# Patient Record
Sex: MALE | Race: WHITE | Employment: FULL TIME | URBAN - METROPOLITAN AREA
[De-identification: names, ages, dates, MRNs, and addresses within clinical notes are randomized per-mention and may not be internally consistent; named-entity substitution may affect disease eponyms.]

---

## 2017-02-11 ENCOUNTER — HOSPITAL ENCOUNTER (EMERGENCY)
Age: 32
Discharge: HOME | End: 2017-02-11
Attending: EMERGENCY MEDICINE
Payer: MEDICAID

## 2017-02-11 ENCOUNTER — APPOINTMENT (OUTPATIENT)
Dept: GENERAL RADIOLOGY | Age: 32
End: 2017-02-11
Attending: EMERGENCY MEDICINE
Payer: MEDICAID

## 2017-02-11 ENCOUNTER — APPOINTMENT (OUTPATIENT)
Dept: CT IMAGING | Age: 32
End: 2017-02-11
Attending: EMERGENCY MEDICINE
Payer: MEDICAID

## 2017-02-11 VITALS
HEIGHT: 74 IN | HEART RATE: 93 BPM | SYSTOLIC BLOOD PRESSURE: 144 MMHG | RESPIRATION RATE: 18 BRPM | WEIGHT: 315 LBS | DIASTOLIC BLOOD PRESSURE: 89 MMHG | BODY MASS INDEX: 40.43 KG/M2 | TEMPERATURE: 97 F | OXYGEN SATURATION: 96 %
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
## 2017-02-11 DIAGNOSIS — S22.42XA CLOSED FRACTURE OF MULTIPLE RIBS OF LEFT SIDE, INITIAL ENCOUNTER: ICD-10-CM

## 2017-02-11 LAB
ALBUMIN SERPL-MCNC: 4.3 G/DL (ref 3.5–4.8)
ALP LIVER SERPL-CCNC: 98 U/L (ref 45–117)
ALT SERPL-CCNC: 36 U/L (ref 17–63)
AST SERPL-CCNC: 26 U/L (ref 15–41)
BASOPHILS # BLD AUTO: 0.05 X10(3) UL (ref 0–0.1)
BASOPHILS NFR BLD AUTO: 0.4 %
BILIRUB SERPL-MCNC: 0.6 MG/DL (ref 0.1–2)
BUN BLD-MCNC: 15 MG/DL (ref 8–20)
CALCIUM BLD-MCNC: 9 MG/DL (ref 8.3–10.3)
CHLORIDE: 106 MMOL/L (ref 101–111)
CO2: 25 MMOL/L (ref 22–32)
CREAT BLD-MCNC: 0.8 MG/DL (ref 0.7–1.3)
EOSINOPHIL # BLD AUTO: 0.05 X10(3) UL (ref 0–0.3)
EOSINOPHIL NFR BLD AUTO: 0.4 %
ERYTHROCYTE [DISTWIDTH] IN BLOOD BY AUTOMATED COUNT: 13.1 % (ref 11.5–16)
GLUCOSE BLD-MCNC: 116 MG/DL (ref 70–99)
HCT VFR BLD AUTO: 46.6 % (ref 37–53)
HGB BLD-MCNC: 16 G/DL (ref 13–17)
IMMATURE GRANULOCYTE COUNT: 0.06 X10(3) UL (ref 0–1)
IMMATURE GRANULOCYTE RATIO %: 0.4 %
LYMPHOCYTES # BLD AUTO: 2.32 X10(3) UL (ref 0.9–4)
LYMPHOCYTES NFR BLD AUTO: 16.4 %
M PROTEIN MFR SERPL ELPH: 8.1 G/DL (ref 6.1–8.3)
MCH RBC QN AUTO: 33.5 PG (ref 27–33.2)
MCHC RBC AUTO-ENTMCNC: 34.3 G/DL (ref 31–37)
MCV RBC AUTO: 97.5 FL (ref 80–99)
MONOCYTES # BLD AUTO: 1.08 X10(3) UL (ref 0.1–0.6)
MONOCYTES NFR BLD AUTO: 7.6 %
NEUTROPHIL ABS PRELIM: 10.57 X10 (3) UL (ref 1.3–6.7)
NEUTROPHILS # BLD AUTO: 10.57 X10(3) UL (ref 1.3–6.7)
NEUTROPHILS NFR BLD AUTO: 74.8 %
PLATELET # BLD AUTO: 291 10(3)UL (ref 150–450)
POTASSIUM SERPL-SCNC: 4.8 MMOL/L (ref 3.6–5.1)
RBC # BLD AUTO: 4.78 X10(6)UL (ref 4.3–5.7)
RED CELL DISTRIBUTION WIDTH-SD: 47.3 FL (ref 35.1–46.3)
SODIUM SERPL-SCNC: 140 MMOL/L (ref 136–144)
WBC # BLD AUTO: 14.1 X10(3) UL (ref 4–13)

## 2017-02-11 PROCEDURE — 71101 X-RAY EXAM UNILAT RIBS/CHEST: CPT

## 2017-02-11 PROCEDURE — 99284 EMERGENCY DEPT VISIT MOD MDM: CPT

## 2017-02-11 PROCEDURE — 96375 TX/PRO/DX INJ NEW DRUG ADDON: CPT

## 2017-02-11 PROCEDURE — 80053 COMPREHEN METABOLIC PANEL: CPT | Performed by: EMERGENCY MEDICINE

## 2017-02-11 PROCEDURE — 85025 COMPLETE CBC W/AUTO DIFF WBC: CPT | Performed by: EMERGENCY MEDICINE

## 2017-02-11 PROCEDURE — 74177 CT ABD & PELVIS W/CONTRAST: CPT

## 2017-02-11 PROCEDURE — 96374 THER/PROPH/DIAG INJ IV PUSH: CPT

## 2017-02-11 RX ORDER — OMEPRAZOLE 20 MG/1
20 CAPSULE, DELAYED RELEASE ORAL
COMMUNITY

## 2017-02-11 RX ORDER — HYDROMORPHONE HYDROCHLORIDE 1 MG/ML
1 INJECTION, SOLUTION INTRAMUSCULAR; INTRAVENOUS; SUBCUTANEOUS ONCE
Status: COMPLETED | OUTPATIENT
Start: 2017-02-11 | End: 2017-02-11

## 2017-02-11 RX ORDER — HYDROCODONE BITARTRATE AND ACETAMINOPHEN 5; 325 MG/1; MG/1
1-2 TABLET ORAL EVERY 4 HOURS PRN
Qty: 15 TABLET | Refills: 0 | Status: SHIPPED | OUTPATIENT
Start: 2017-02-11 | End: 2022-01-04

## 2017-02-11 RX ORDER — ONDANSETRON 2 MG/ML
4 INJECTION INTRAMUSCULAR; INTRAVENOUS ONCE
Status: COMPLETED | OUTPATIENT
Start: 2017-02-11 | End: 2017-02-11

## 2017-02-11 NOTE — ED PROVIDER NOTES
Patient Seen in: THE [facility] Emergency Department In [place]    History   Patient presents with:  Trauma (cardiovascular, musculoskeletal)    Stated Complaint: fell down stairs last night/L sided rib pain/ some SOB    HPI    Patient is a 75-year-old male p as noted in HPI. Constitutional and vital signs reviewed. All other systems reviewed and negative except as noted above. PSFH elements reviewed from today and agreed except as otherwise stated in HPI.     Physical Exam       ED Triage Vitals   BP 0 -----------         ------                     CBC W/ DIFFERENTIAL[[id number]]          Abnormal            Final result                 Please view results for these tests on the individual orders.      CT abdomen pelvis: Fractures

## 2019-01-01 ENCOUNTER — EXTERNAL RECORD (OUTPATIENT)
Dept: HEALTH INFORMATION MANAGEMENT | Facility: OTHER | Age: 34
End: 2019-01-01

## 2019-01-08 ENCOUNTER — EXTERNAL RECORD (OUTPATIENT)
Dept: CARDIOLOGY | Age: 34
End: 2019-01-08

## 2019-01-08 ENCOUNTER — EXTERNAL RECORD (OUTPATIENT)
Dept: OTHER | Age: 34
End: 2019-01-08

## 2019-01-08 LAB
BEDSIDE GLUCOSE: 107 MG/DL (ref 70–110)
D-DIMER, QUANTITATIVE: 775 NG/MLFEU (ref 0–622)
TROPONIN I (TROP): < 0.03 NG/ML

## 2019-01-09 LAB
*MEAN CORPUSCULAR HGB CONC: 34.5 G/DL (ref 32.5–35.8)
AMORPHOUS: ABNORMAL /HPF
AMPHETAMINES, URINE, QUAL: NEGATIVE
ANION GAP: 9 MEQ/L (ref 8–16)
APPEARANCE: CLEAR
BACTERIA: ABNORMAL /HPF
BARBITURATES, URINE, QUAL: NEGATIVE
BASOPHIL AUTOMATED: 0.8 %
BASOPHILS: 0.1 (ref 0–0.2)
BENZODIAZEPINES, URINE, QUAL: NEGATIVE
BILIRUBIN: ABNORMAL
BLOOD UREA NITROGEN (BUN): 13 MG/DL (ref 7–25)
BUN/CREATININE RATIO: 23.2 (ref 7.4–23)
CALCIUM, SERUM: 9.1 MG/DL (ref 8.6–10.3)
CANNABINOIDS, URINE, QUAL: NEGATIVE
CARBON DIOXIDE: 28 MMOL/L (ref 21–31)
CHLORIDE, SERUM: 97 MMOL/L (ref 98–107)
CHOLESTEROL HDL RATIO: 4.5
CHOLESTEROL: 289 MG/DL
COCAINE, URINE, QUAL: NEGATIVE
COLOR: ABNORMAL
CREATININE: 0.56 MG/DL (ref 0.7–1.3)
CULTURE REFLEX COMMENT: NO
EOSINOPHIL AUTOMATED: 1.3 %
EOSINOPHILS: 0.1 (ref 0–0.5)
EST GLOMERULAR FILTRATION RATE: > 60 1.73M SQ
ESTIMATED AVERAGE GLUCOSE: 117 MG/DL
GLUCOSE, URINE, AUTOMATED: ABNORMAL MG/DL
GLUCOSE: 132 MG/DL (ref 70–99)
HDL CHOLESTEROL: 64 MG/DL
HEMATOCRIT: 39.4 % (ref 38.6–49.2)
HEMOGLOBIN A1C (GLYCOSYLATED): 5.7 %
HEMOGLOBIN: 13.6 GM/DL (ref 13–17)
K (POTASSIUM, SERUM): 3.6 MMOL/L (ref 3.5–5.1)
KETONES, URINE, AUTOMATED: ABNORMAL MG/DL
LDL CHOLESTEROL DIRECT: 211 MG/DL (ref 0–129)
LEUKOCYTE, URINE, AUTOMATED: ABNORMAL
LYMPHOCYTE AUTOMATED: 16 %
LYMPHOCYTES: 1.2 (ref 0.6–3.4)
MEAN CORPUSCULAR HGB: 36.1 PG (ref 26–34)
MEAN CORPUSCULAR VOL: 104.7 FL (ref 80–100)
MEAN PLATELET VOLUME: 10.1 FL (ref 6.8–10.2)
MONOCYTE AUTOMATED: 10.6 %
MONOCYTES: 0.8 (ref 0.3–1)
MRSA SCREEN, PCR: NEGATIVE
MRSA SOURCE: NORMAL
NA (SODIUM, SERUM): 134 MMOL/L (ref 133–144)
NEUTROPHIL ABSOLUTE: 5.3 (ref 1.7–7.7)
NEUTROPHIL AUTOMATED: 71.3 %
NITRITE, URINE AUTOMATED: NEGATIVE
NON HDL CHOLESTEROL: 225 MG/DL
OPIATES, URINE, QUAL: POSITIVE
PH, URINE: 6 (ref 5–8)
PHENCYCLIDINE, URINE, QUAL: NEGATIVE
PLATELET COUNT: 135 K/MM3 (ref 150–450)
PROTEIN, URINE: 30 MG/DL
RBC: ABNORMAL /HPF (ref 0–2)
RED BLOOD CELL COUNT: 3.77 M/MM3 (ref 4.34–5.6)
RED CELL DISTRIBUTIO: 13.1 % (ref 11.9–15.9)
SPECIFIC GRAVITY UA: 1.04 (ref 1–1.03)
SQUAMOUS EPITHELIAL: ABNORMAL /LPF
THYROID STIMULATING: 1.62 MIU/ML (ref 0.27–4.2)
TRIGLYCERIDES: 70 MG/DL
URINE, BLOOD, AUTOMATED: ABNORMAL
UROBILINOGEN, URINE, AUTOMATED: >4 MG/DL
VLDL CHOLESTEROL: 14 MG/DL (ref 5–30)
WBC: ABNORMAL /HPF (ref 0–5)
WHITE BLOOD CELL COU: 7.5 K/MM3 (ref 4–11)

## 2019-01-10 ENCOUNTER — TELEPHONE (OUTPATIENT)
Dept: CARDIOLOGY | Age: 34
End: 2019-01-10

## 2019-01-10 LAB
*MEAN CORPUSCULAR HGB CONC: 34.2 G/DL (ref 32.5–35.8)
ANION GAP: 8 MEQ/L (ref 8–16)
BASOPHIL AUTOMATED: 0.4 %
BASOPHILS: 0 (ref 0–0.2)
BLOOD UREA NITROGEN (BUN): 17 MG/DL (ref 7–25)
BUN/CREATININE RATIO: 29.8 (ref 7.4–23)
CALCIUM, SERUM: 9.2 MG/DL (ref 8.6–10.3)
CARBON DIOXIDE: 30 MMOL/L (ref 21–31)
CHLORIDE, SERUM: 98 MMOL/L (ref 98–107)
CREATININE: 0.57 MG/DL (ref 0.7–1.3)
EOSINOPHIL AUTOMATED: 2.2 %
EOSINOPHILS: 0.1 (ref 0–0.5)
EST GLOMERULAR FILTRATION RATE: > 60 1.73M SQ
GLUCOSE: 110 MG/DL (ref 70–99)
HEMATOCRIT: 38.6 % (ref 38.6–49.2)
HEMOGLOBIN: 13.2 GM/DL (ref 13–17)
K (POTASSIUM, SERUM): 3.7 MMOL/L (ref 3.5–5.1)
LYMPHOCYTE AUTOMATED: 20.8 %
LYMPHOCYTES: 1.4 (ref 0.6–3.4)
MEAN CORPUSCULAR HGB: 36.2 PG (ref 26–34)
MEAN CORPUSCULAR VOL: 105.8 FL (ref 80–100)
MEAN PLATELET VOLUME: 10 FL (ref 6.8–10.2)
MONOCYTE AUTOMATED: 10.4 %
MONOCYTES: 0.7 (ref 0.3–1)
NA (SODIUM, SERUM): 136 MMOL/L (ref 133–144)
NEUTROPHIL ABSOLUTE: 4.5 (ref 1.7–7.7)
NEUTROPHIL AUTOMATED: 66.2 %
PLATELET COUNT: 147 K/MM3 (ref 150–450)
RED BLOOD CELL COUNT: 3.65 M/MM3 (ref 4.34–5.6)
RED CELL DISTRIBUTIO: 12.9 % (ref 11.9–15.9)
WHITE BLOOD CELL COU: 6.7 K/MM3 (ref 4–11)

## 2019-01-14 ENCOUNTER — TELEPHONE (OUTPATIENT)
Dept: INTERNAL MEDICINE | Age: 34
End: 2019-01-14

## 2019-01-14 ENCOUNTER — TELEPHONE (OUTPATIENT)
Dept: ORTHOPEDICS | Age: 34
End: 2019-01-14

## 2019-01-14 ENCOUNTER — EXTERNAL RECORD (OUTPATIENT)
Dept: ORTHOPEDICS | Age: 34
End: 2019-01-14

## 2019-01-17 ENCOUNTER — TELEPHONE (OUTPATIENT)
Dept: SCHEDULING | Age: 34
End: 2019-01-17

## 2019-01-18 ENCOUNTER — OFFICE VISIT (OUTPATIENT)
Dept: ORTHOPEDICS | Age: 34
End: 2019-01-18

## 2019-01-18 VITALS — WEIGHT: 315 LBS | HEIGHT: 74 IN | BODY MASS INDEX: 40.43 KG/M2

## 2019-01-18 DIAGNOSIS — S42.025D CLOSED NONDISPLACED FRACTURE OF SHAFT OF LEFT CLAVICLE WITH ROUTINE HEALING, SUBSEQUENT ENCOUNTER: Primary | ICD-10-CM

## 2019-01-18 DIAGNOSIS — E66.01 OBESITY, MORBID (MORE THAN 100 LBS OVER IDEAL WEIGHT OR BMI > 40) (CMD): ICD-10-CM

## 2019-01-18 PROCEDURE — 99214 OFFICE O/P EST MOD 30 MIN: CPT | Performed by: ORTHOPAEDIC SURGERY

## 2019-01-18 RX ORDER — HYDROCODONE BITARTRATE AND ACETAMINOPHEN 10; 325 MG/1; MG/1
TABLET ORAL
Qty: 40 TABLET | Refills: 0 | Status: SHIPPED | OUTPATIENT
Start: 2019-01-18

## 2019-01-21 ENCOUNTER — OFFICE VISIT (OUTPATIENT)
Dept: CARDIOLOGY | Age: 34
End: 2019-01-21

## 2019-01-21 VITALS
OXYGEN SATURATION: 92 % | HEIGHT: 74 IN | HEART RATE: 103 BPM | RESPIRATION RATE: 22 BRPM | DIASTOLIC BLOOD PRESSURE: 93 MMHG | BODY MASS INDEX: 40.43 KG/M2 | WEIGHT: 315 LBS | SYSTOLIC BLOOD PRESSURE: 137 MMHG

## 2019-01-21 DIAGNOSIS — E78.2 MIXED DYSLIPIDEMIA: ICD-10-CM

## 2019-01-21 DIAGNOSIS — E66.9 OBESITY (BMI 30-39.9): ICD-10-CM

## 2019-01-21 DIAGNOSIS — S42.025D CLOSED NONDISPLACED FRACTURE OF SHAFT OF LEFT CLAVICLE WITH ROUTINE HEALING, SUBSEQUENT ENCOUNTER: ICD-10-CM

## 2019-01-21 DIAGNOSIS — R06.83 SNORING: ICD-10-CM

## 2019-01-21 DIAGNOSIS — R07.9 CHEST PAIN, UNSPECIFIED TYPE: Primary | ICD-10-CM

## 2019-01-21 DIAGNOSIS — F17.200 CURRENT SMOKER: ICD-10-CM

## 2019-01-21 DIAGNOSIS — I10 ESSENTIAL HYPERTENSION: ICD-10-CM

## 2019-01-21 PROBLEM — S42.022A CLOSED FRACTURE OF SHAFT OF LEFT CLAVICLE: Status: ACTIVE | Noted: 2019-01-21

## 2019-01-21 PROCEDURE — 99205 OFFICE O/P NEW HI 60 MIN: CPT | Performed by: NURSE PRACTITIONER

## 2019-01-21 PROCEDURE — 3075F SYST BP GE 130 - 139MM HG: CPT | Performed by: NURSE PRACTITIONER

## 2019-01-21 PROCEDURE — 93000 ELECTROCARDIOGRAM COMPLETE: CPT | Performed by: NURSE PRACTITIONER

## 2019-01-21 PROCEDURE — 3080F DIAST BP >= 90 MM HG: CPT | Performed by: NURSE PRACTITIONER

## 2019-01-21 RX ORDER — AMLODIPINE BESYLATE 10 MG/1
10 TABLET ORAL DAILY
Qty: 30 TABLET | Refills: 6 | Status: SHIPPED | OUTPATIENT
Start: 2019-01-21

## 2019-01-21 RX ORDER — ATORVASTATIN CALCIUM 20 MG/1
20 TABLET, FILM COATED ORAL DAILY
Qty: 30 TABLET | Refills: 6 | Status: SHIPPED | OUTPATIENT
Start: 2019-01-21

## 2019-01-21 RX ORDER — CARVEDILOL 25 MG/1
25 TABLET ORAL 2 TIMES DAILY WITH MEALS
Qty: 60 TABLET | Refills: 6 | Status: SHIPPED | OUTPATIENT
Start: 2019-01-21

## 2019-01-28 ENCOUNTER — APPOINTMENT (OUTPATIENT)
Dept: CARDIOLOGY | Age: 34
End: 2019-01-28

## 2019-01-28 ENCOUNTER — EXTERNAL RECORD (OUTPATIENT)
Dept: OTHER | Age: 34
End: 2019-01-28

## 2019-02-02 PROBLEM — E66.01 OBESITY, MORBID (MORE THAN 100 LBS OVER IDEAL WEIGHT OR BMI > 40) (CMD): Status: ACTIVE | Noted: 2019-02-02

## 2019-02-14 ENCOUNTER — TELEPHONE (OUTPATIENT)
Dept: CARDIOLOGY | Age: 34
End: 2019-02-14

## 2019-04-25 ENCOUNTER — HOSPITAL ENCOUNTER (EMERGENCY)
Facility: HOSPITAL | Age: 34
Discharge: HOME | End: 2019-04-25
Attending: EMERGENCY MEDICINE
Payer: MEDICAID

## 2019-04-25 ENCOUNTER — APPOINTMENT (OUTPATIENT)
Dept: CT IMAGING | Facility: HOSPITAL | Age: 34
End: 2019-04-25
Attending: EMERGENCY MEDICINE
Payer: MEDICAID

## 2019-04-25 VITALS
BODY MASS INDEX: 40.43 KG/M2 | SYSTOLIC BLOOD PRESSURE: 144 MMHG | HEIGHT: 74 IN | DIASTOLIC BLOOD PRESSURE: 80 MMHG | OXYGEN SATURATION: 98 % | TEMPERATURE: 98 F | WEIGHT: 315 LBS | RESPIRATION RATE: 18 BRPM | HEART RATE: 76 BPM

## 2019-04-25 DIAGNOSIS — R22.2 SUPRACLAVICULAR MASS: Primary | ICD-10-CM

## 2019-04-25 PROCEDURE — 80048 BASIC METABOLIC PNL TOTAL CA: CPT | Performed by: EMERGENCY MEDICINE

## 2019-04-25 PROCEDURE — 71260 CT THORAX DX C+: CPT | Performed by: EMERGENCY MEDICINE

## 2019-04-25 PROCEDURE — 99285 EMERGENCY DEPT VISIT HI MDM: CPT

## 2019-04-25 PROCEDURE — 70491 CT SOFT TISSUE NECK W/DYE: CPT | Performed by: EMERGENCY MEDICINE

## 2019-04-25 PROCEDURE — 80076 HEPATIC FUNCTION PANEL: CPT | Performed by: EMERGENCY MEDICINE

## 2019-04-25 PROCEDURE — 85025 COMPLETE CBC W/AUTO DIFF WBC: CPT | Performed by: EMERGENCY MEDICINE

## 2019-04-25 PROCEDURE — 83690 ASSAY OF LIPASE: CPT | Performed by: EMERGENCY MEDICINE

## 2019-04-25 PROCEDURE — 96365 THER/PROPH/DIAG IV INF INIT: CPT

## 2019-04-25 RX ORDER — CEPHALEXIN 500 MG/1
500 CAPSULE ORAL 3 TIMES DAILY
Qty: 21 CAPSULE | Refills: 0 | Status: SHIPPED | OUTPATIENT
Start: 2019-04-25 | End: 2019-05-02

## 2019-04-25 RX ORDER — CARVEDILOL 25 MG/1
25 TABLET ORAL 2 TIMES DAILY WITH MEALS
COMMUNITY
End: 2022-01-04

## 2019-04-25 RX ORDER — SERTRALINE HYDROCHLORIDE 100 MG/1
100 TABLET, FILM COATED ORAL DAILY
COMMUNITY
End: 2022-01-04

## 2019-04-25 RX ORDER — ATORVASTATIN CALCIUM 20 MG/1
20 TABLET, FILM COATED ORAL NIGHTLY
COMMUNITY
End: 2022-01-04

## 2019-04-25 RX ORDER — IBUPROFEN 600 MG/1
600 TABLET ORAL ONCE
Status: COMPLETED | OUTPATIENT
Start: 2019-04-25 | End: 2019-04-25

## 2019-04-25 RX ORDER — ACETAMINOPHEN 500 MG
1000 TABLET ORAL ONCE
Status: COMPLETED | OUTPATIENT
Start: 2019-04-25 | End: 2019-04-25

## 2019-04-25 NOTE — CM/SW NOTE
Received call back from Sandor Stark of ENT at [facility]. Pt has appointment with Dr. Atul Garcia 4/26 @6090 at [phone] [facility]. # [phone]. Taz Bardford will call patient and notify of above, Dr. Therese Gutierrez notified here also of above.

## 2019-04-25 NOTE — ED NOTES
Patient cleared for discharge by MD. Belongings with patient. Patient discharge instructions reviewed with patient including when and how to follow up  with healthcare provider and when to seek medical treatment. Peripheral IV removed.  Medication use and p

## 2019-04-26 NOTE — ED PROVIDER NOTES
Patient Seen in: [facility] AND [facility] Emergency Department    History   Patient presents with:  Swelling Edema (cardiovascular, metabolic)    Stated Complaint: swelling to left sdie of neck    HPI    Patient complains of left sided neck swelling for at leas by mouth every 4 (four) hours as needed for Pain. History reviewed. No pertinent family history.     Social History    Tobacco Use      Smoking status: Current Some Day Smoker        Packs/day: 0.10      Smokeless tobacco: Never Used      Tobacco comm limits   HEPATIC FUNCTION PANEL (7) - Abnormal; Notable for the following components:    AST 11 (*)     All other components within normal limits   CBC W/ DIFFERENTIAL - Abnormal; Notable for the following components:    WBC 12.5 (*)     Neutrophil Absolut Mariam Swanson MD on 4/25/2019 at 12:04            Procedures:      Critical Care:      Spoke with ENT who reviewed films felt this would be a difficult case based on location of lesion that would be best suited at \"university\" setting.   Spoke with [facility] who

## 2019-04-29 ENCOUNTER — HOSPITAL ENCOUNTER (EMERGENCY)
Facility: HOSPITAL | Age: 34
Discharge: HOME | End: 2019-04-29
Attending: EMERGENCY MEDICINE
Payer: MEDICAID

## 2019-04-29 VITALS
BODY MASS INDEX: 40.43 KG/M2 | RESPIRATION RATE: 16 BRPM | WEIGHT: 315 LBS | TEMPERATURE: 98 F | DIASTOLIC BLOOD PRESSURE: 84 MMHG | OXYGEN SATURATION: 97 % | HEIGHT: 74 IN | HEART RATE: 87 BPM | SYSTOLIC BLOOD PRESSURE: 137 MMHG

## 2019-04-29 DIAGNOSIS — L02.11 NECK ABSCESS: Primary | ICD-10-CM

## 2019-04-29 PROCEDURE — 99282 EMERGENCY DEPT VISIT SF MDM: CPT

## 2019-04-29 RX ORDER — IBUPROFEN 600 MG/1
600 TABLET ORAL ONCE
Status: COMPLETED | OUTPATIENT
Start: 2019-04-29 | End: 2019-04-29

## 2019-04-30 NOTE — ED NOTES
Pt presents to Ed for a lump on the left side of his neck. Pt states he was seen here on Thursday and prescribed antibiotics. Pt states the lump is getting larger. Pt states on Thursday the lump was very hard and now it is softening.   Pt denies any constan

## 2019-04-30 NOTE — ED PROVIDER NOTES
Patient Seen in: [facility] [place] AND [facility] Emergency Department    History   Patient presents with:  Lump Mass (integumentary)    Stated Complaint: lump to the base of left neck that tripple in size    HPI    The patient is a 68-year-old male who presents with a Current:/83   Pulse 96   Temp 97.8 °F (36.6 °C) (Oral)   Resp 18   Ht 188 cm (6' 2\")   Wt (!) 154.2 kg   SpO2 97%   BMI 43.65 kg/m²         Physical Exam   Constitutional: He is oriented to person, place, and time.  He appears well-developed and well

## 2019-04-30 NOTE — ED INITIAL ASSESSMENT (HPI)
Pt was seen on Thursday for new lump on left side of neck. Was DC w abx and returns today with reports inc in size and pulsating pain. Saw ENT follow up suggested by us at DC and pt states there was no new orders.

## 2020-05-21 ENCOUNTER — OFFICE VISIT (OUTPATIENT)
Dept: OCCUPATIONAL MEDICINE | Age: 35
End: 2020-05-21
Attending: FAMILY MEDICINE

## 2022-01-03 ENCOUNTER — HOSPITAL ENCOUNTER (EMERGENCY)
Facility: HOSPITAL | Age: 37
Discharge: HOME | End: 2022-01-03
Attending: EMERGENCY MEDICINE
Payer: MEDICAID

## 2022-01-03 ENCOUNTER — APPOINTMENT (OUTPATIENT)
Dept: GENERAL RADIOLOGY | Facility: HOSPITAL | Age: 37
End: 2022-01-03
Attending: EMERGENCY MEDICINE
Payer: MEDICAID

## 2022-01-03 VITALS
SYSTOLIC BLOOD PRESSURE: 177 MMHG | DIASTOLIC BLOOD PRESSURE: 89 MMHG | RESPIRATION RATE: 22 BRPM | BODY MASS INDEX: 40.43 KG/M2 | HEIGHT: 74 IN | HEART RATE: 89 BPM | WEIGHT: 315 LBS | OXYGEN SATURATION: 97 %

## 2022-01-03 DIAGNOSIS — S32.020A COMPRESSION FRACTURE OF L2 VERTEBRA, INITIAL ENCOUNTER (HCC): Primary | ICD-10-CM

## 2022-01-03 PROCEDURE — 72110 X-RAY EXAM L-2 SPINE 4/>VWS: CPT | Performed by: EMERGENCY MEDICINE

## 2022-01-03 PROCEDURE — 99283 EMERGENCY DEPT VISIT LOW MDM: CPT

## 2022-01-03 RX ORDER — HYDROCODONE BITARTRATE AND ACETAMINOPHEN 5; 325 MG/1; MG/1
1-2 TABLET ORAL EVERY 6 HOURS PRN
Qty: 20 TABLET | Refills: 0 | Status: SHIPPED | OUTPATIENT
Start: 2022-01-03 | End: 2022-01-07

## 2022-01-03 RX ORDER — HYDROCODONE BITARTRATE AND ACETAMINOPHEN 5; 325 MG/1; MG/1
2 TABLET ORAL ONCE
Status: COMPLETED | OUTPATIENT
Start: 2022-01-03 | End: 2022-01-03

## 2022-01-03 RX ORDER — DIAZEPAM 5 MG/1
5 TABLET ORAL ONCE
Status: COMPLETED | OUTPATIENT
Start: 2022-01-03 | End: 2022-01-03

## 2022-01-03 RX ORDER — DIAZEPAM 5 MG/1
5 TABLET ORAL EVERY 12 HOURS PRN
Qty: 10 TABLET | Refills: 0 | Status: SHIPPED | OUTPATIENT
Start: 2022-01-03 | End: 2022-01-07

## 2022-01-03 NOTE — ED PROVIDER NOTES
Patient Seen in: [facility] Emergency Department      History   Patient presents with:  Back Pain    Stated Complaint: Back pain, felt pop while taking down xmas lights     Subjective:   HPI    40-year-old man denies any past medical history, here f 98   Resp 24   Ht 188 cm (6' 2\")   Wt (!) 181.4 kg   SpO2 96%   BMI 51.36 kg/m²         Physical Exam        Physical Exam  Vitals signs and nursing note reviewed.    General: Younger gentleman sitting in the bed, appears very uncomfortable  Head: Normocep back pain. Does appear to have  an acute compression fracture at L2. He is neurologically intact. Reports severe discomfort.   Did discuss with the patient the option of staying in the hospital for possible vertebroplasty, patient has elected instead to 0    !! - Potential duplicate medications found. Please discuss with provider.

## 2022-01-03 NOTE — ED INITIAL ASSESSMENT (HPI)
Pt to the ED with c/o back pain. Pt states he was taking down marisel decorations when he felt a pop in his mid-lower back.

## 2022-01-04 ENCOUNTER — APPOINTMENT (OUTPATIENT)
Dept: MRI IMAGING | Facility: HOSPITAL | Age: 37
End: 2022-01-04
Attending: EMERGENCY MEDICINE
Payer: MEDICAID

## 2022-01-04 ENCOUNTER — HOSPITAL ENCOUNTER (OUTPATIENT)
Facility: HOSPITAL | Age: 37
Setting detail: OBSERVATION
Discharge: HOME | End: 2022-01-07
Attending: EMERGENCY MEDICINE | Admitting: HOSPITALIST
Payer: MEDICAID

## 2022-01-04 ENCOUNTER — APPOINTMENT (OUTPATIENT)
Dept: MRI IMAGING | Facility: HOSPITAL | Age: 37
End: 2022-01-04
Attending: NURSE PRACTITIONER
Payer: MEDICAID

## 2022-01-04 ENCOUNTER — APPOINTMENT (OUTPATIENT)
Dept: CT IMAGING | Facility: HOSPITAL | Age: 37
End: 2022-01-04
Attending: EMERGENCY MEDICINE
Payer: MEDICAID

## 2022-01-04 DIAGNOSIS — S32.020D COMPRESSION FRACTURE OF L2 VERTEBRA WITH ROUTINE HEALING, SUBSEQUENT ENCOUNTER: Primary | ICD-10-CM

## 2022-01-04 PROBLEM — S32.020A COMPRESSION FRACTURE OF L2 LUMBAR VERTEBRA (HCC): Status: ACTIVE | Noted: 2022-01-04

## 2022-01-04 LAB
ALBUMIN SERPL-MCNC: 3.6 G/DL (ref 3.4–5)
ALBUMIN/GLOB SERPL: 0.9 {RATIO} (ref 1–2)
ALP LIVER SERPL-CCNC: 89 U/L
ALT SERPL-CCNC: 42 U/L
ANION GAP SERPL CALC-SCNC: 6 MMOL/L (ref 0–18)
AST SERPL-CCNC: 23 U/L (ref 15–37)
BASOPHILS # BLD AUTO: 0.05 X10(3) UL (ref 0–0.2)
BASOPHILS NFR BLD AUTO: 0.5 %
BILIRUB SERPL-MCNC: 1.3 MG/DL (ref 0.1–2)
BUN BLD-MCNC: 8 MG/DL (ref 7–18)
CALCIUM BLD-MCNC: 9.4 MG/DL (ref 8.5–10.1)
CHLORIDE SERPL-SCNC: 101 MMOL/L (ref 98–112)
CO2 SERPL-SCNC: 30 MMOL/L (ref 21–32)
CREAT BLD-MCNC: 0.71 MG/DL
EOSINOPHIL # BLD AUTO: 0.06 X10(3) UL (ref 0–0.7)
EOSINOPHIL NFR BLD AUTO: 0.6 %
ERYTHROCYTE [DISTWIDTH] IN BLOOD BY AUTOMATED COUNT: 13 %
GLOBULIN PLAS-MCNC: 4.2 G/DL (ref 2.8–4.4)
GLUCOSE BLD-MCNC: 137 MG/DL (ref 70–99)
HCT VFR BLD AUTO: 47.9 %
HGB BLD-MCNC: 15.5 G/DL
IMM GRANULOCYTES # BLD AUTO: 0.05 X10(3) UL (ref 0–1)
IMM GRANULOCYTES NFR BLD: 0.5 %
LYMPHOCYTES # BLD AUTO: 0.92 X10(3) UL (ref 1–4)
LYMPHOCYTES NFR BLD AUTO: 9.1 %
MCH RBC QN AUTO: 32.3 PG (ref 26–34)
MCHC RBC AUTO-ENTMCNC: 32.4 G/DL (ref 31–37)
MCV RBC AUTO: 99.8 FL
MONOCYTES # BLD AUTO: 0.9 X10(3) UL (ref 0.1–1)
MONOCYTES NFR BLD AUTO: 8.9 %
NEUTROPHILS # BLD AUTO: 8.18 X10 (3) UL (ref 1.5–7.7)
NEUTROPHILS # BLD AUTO: 8.18 X10(3) UL (ref 1.5–7.7)
NEUTROPHILS NFR BLD AUTO: 80.4 %
OSMOLALITY SERPL CALC.SUM OF ELEC: 284 MOSM/KG (ref 275–295)
PLATELET # BLD AUTO: 206 10(3)UL (ref 150–450)
POTASSIUM SERPL-SCNC: 3.8 MMOL/L (ref 3.5–5.1)
PROT SERPL-MCNC: 7.8 G/DL (ref 6.4–8.2)
RBC # BLD AUTO: 4.8 X10(6)UL
SARS-COV-2 RNA RESP QL NAA+PROBE: NOT DETECTED
SODIUM SERPL-SCNC: 137 MMOL/L (ref 136–145)
WBC # BLD AUTO: 10.2 X10(3) UL (ref 4–11)

## 2022-01-04 PROCEDURE — 99243 OFF/OP CNSLTJ NEW/EST LOW 30: CPT | Performed by: NEUROLOGICAL SURGERY

## 2022-01-04 PROCEDURE — 72146 MRI CHEST SPINE W/O DYE: CPT | Performed by: NURSE PRACTITIONER

## 2022-01-04 PROCEDURE — 99220 INITIAL OBSERVATION CARE,LEVL III: CPT | Performed by: INTERNAL MEDICINE

## 2022-01-04 PROCEDURE — 72148 MRI LUMBAR SPINE W/O DYE: CPT | Performed by: EMERGENCY MEDICINE

## 2022-01-04 PROCEDURE — 72131 CT LUMBAR SPINE W/O DYE: CPT | Performed by: EMERGENCY MEDICINE

## 2022-01-04 RX ORDER — ONDANSETRON 2 MG/ML
4 INJECTION INTRAMUSCULAR; INTRAVENOUS EVERY 6 HOURS PRN
Status: DISCONTINUED | OUTPATIENT
Start: 2022-01-04 | End: 2022-01-07

## 2022-01-04 RX ORDER — HYDROCODONE BITARTRATE AND ACETAMINOPHEN 5; 325 MG/1; MG/1
2 TABLET ORAL EVERY 4 HOURS PRN
Status: DISCONTINUED | OUTPATIENT
Start: 2022-01-04 | End: 2022-01-06

## 2022-01-04 RX ORDER — AMLODIPINE BESYLATE 5 MG/1
10 TABLET ORAL DAILY
Status: DISCONTINUED | OUTPATIENT
Start: 2022-01-04 | End: 2022-01-07

## 2022-01-04 RX ORDER — OXYCODONE HCL 10 MG/1
10 TABLET, FILM COATED, EXTENDED RELEASE ORAL EVERY 12 HOURS
Status: DISCONTINUED | OUTPATIENT
Start: 2022-01-04 | End: 2022-01-06

## 2022-01-04 RX ORDER — HYDROCODONE BITARTRATE AND ACETAMINOPHEN 5; 325 MG/1; MG/1
2 TABLET ORAL EVERY 4 HOURS PRN
Status: DISCONTINUED | OUTPATIENT
Start: 2022-01-04 | End: 2022-01-04

## 2022-01-04 RX ORDER — MORPHINE SULFATE 2 MG/ML
2 INJECTION, SOLUTION INTRAMUSCULAR; INTRAVENOUS EVERY 2 HOUR PRN
Status: DISCONTINUED | OUTPATIENT
Start: 2022-01-04 | End: 2022-01-05

## 2022-01-04 RX ORDER — SENNOSIDES 8.6 MG
17.2 TABLET ORAL DAILY
Status: DISCONTINUED | OUTPATIENT
Start: 2022-01-04 | End: 2022-01-07

## 2022-01-04 RX ORDER — ONDANSETRON 2 MG/ML
4 INJECTION INTRAMUSCULAR; INTRAVENOUS ONCE
Status: COMPLETED | OUTPATIENT
Start: 2022-01-04 | End: 2022-01-04

## 2022-01-04 RX ORDER — HYDROMORPHONE HYDROCHLORIDE 1 MG/ML
1 INJECTION, SOLUTION INTRAMUSCULAR; INTRAVENOUS; SUBCUTANEOUS ONCE
Status: COMPLETED | OUTPATIENT
Start: 2022-01-04 | End: 2022-01-04

## 2022-01-04 RX ORDER — HYDRALAZINE HYDROCHLORIDE 20 MG/ML
10 INJECTION INTRAMUSCULAR; INTRAVENOUS EVERY 4 HOURS PRN
Status: DISCONTINUED | OUTPATIENT
Start: 2022-01-04 | End: 2022-01-07

## 2022-01-04 RX ORDER — POLYETHYLENE GLYCOL 3350 17 G/17G
17 POWDER, FOR SOLUTION ORAL DAILY PRN
Status: DISCONTINUED | OUTPATIENT
Start: 2022-01-04 | End: 2022-01-07

## 2022-01-04 RX ORDER — BISACODYL 10 MG
10 SUPPOSITORY, RECTAL RECTAL
Status: DISCONTINUED | OUTPATIENT
Start: 2022-01-04 | End: 2022-01-07

## 2022-01-04 RX ORDER — MORPHINE SULFATE 10 MG/ML
10 INJECTION, SOLUTION INTRAMUSCULAR; INTRAVENOUS ONCE
Status: COMPLETED | OUTPATIENT
Start: 2022-01-04 | End: 2022-01-04

## 2022-01-04 RX ORDER — LABETALOL HYDROCHLORIDE 5 MG/ML
10 INJECTION, SOLUTION INTRAVENOUS EVERY 4 HOURS PRN
Status: DISCONTINUED | OUTPATIENT
Start: 2022-01-04 | End: 2022-01-07

## 2022-01-04 RX ORDER — DIAZEPAM 10 MG/1
5 TABLET ORAL EVERY 12 HOURS PRN
Status: DISCONTINUED | OUTPATIENT
Start: 2022-01-04 | End: 2022-01-07

## 2022-01-04 RX ORDER — CYCLOBENZAPRINE HCL 10 MG
10 TABLET ORAL 3 TIMES DAILY PRN
Status: DISCONTINUED | OUTPATIENT
Start: 2022-01-04 | End: 2022-01-05

## 2022-01-04 RX ORDER — MORPHINE SULFATE 4 MG/ML
4 INJECTION, SOLUTION INTRAMUSCULAR; INTRAVENOUS EVERY 2 HOUR PRN
Status: DISCONTINUED | OUTPATIENT
Start: 2022-01-04 | End: 2022-01-05

## 2022-01-04 RX ORDER — HYDROCODONE BITARTRATE AND ACETAMINOPHEN 5; 325 MG/1; MG/1
1 TABLET ORAL EVERY 4 HOURS PRN
Status: DISCONTINUED | OUTPATIENT
Start: 2022-01-04 | End: 2022-01-06

## 2022-01-04 RX ORDER — TRAMADOL HYDROCHLORIDE 50 MG/1
50 TABLET ORAL EVERY 6 HOURS PRN
Status: DISCONTINUED | OUTPATIENT
Start: 2022-01-04 | End: 2022-01-06

## 2022-01-04 RX ORDER — MORPHINE SULFATE 2 MG/ML
1 INJECTION, SOLUTION INTRAMUSCULAR; INTRAVENOUS EVERY 2 HOUR PRN
Status: DISCONTINUED | OUTPATIENT
Start: 2022-01-04 | End: 2022-01-05

## 2022-01-04 RX ORDER — HEPARIN SODIUM 5000 [USP'U]/ML
7500 INJECTION, SOLUTION INTRAVENOUS; SUBCUTANEOUS EVERY 8 HOURS SCHEDULED
Status: DISCONTINUED | OUTPATIENT
Start: 2022-01-04 | End: 2022-01-07

## 2022-01-04 NOTE — ED QUICK NOTES
Orders for admission, patient is aware of plan and ready to go upstairs. Any questions, please call ED MARY Meza at extension [phone]. Vaccinated?   Yes  Type of COVID test sent: Rapid  COVID Suspicion level: Low      Titratable drug(s) infusing:N/A  Rate:

## 2022-01-04 NOTE — H&P
[facility]  History and Physical     Bossman James Patient Status:  Observation    10/14/1985 MRN [id number]   [facility] 3NE-A Attending Tricia Galloway MD   Hosp Day # 0 PCP Kateryna Alfred DO     Chief Complaint: Back pain back driving as this medication can impair your senses. ). Do not drink alcohol or drive while taking this medication as it can impair your senses. , Disp: 20 tablet, Rfl: 0  diazePAM 5 MG Oral Tab, Take 1 tablet (5 mg total) by mouth every 12 (twelve) hours as n Creatinine Clearance: 164 mL/min (based on SCr of 0.71 mg/dL). No results for input(s): PTP, INR in the last 168 hours.     COVID-19 Lab Results    COVID-19  Lab Results   Component Value Date    COVID19 Not Detected 01/04/2022       Pro-Calcitonin  No r

## 2022-01-04 NOTE — ED PROVIDER NOTES
Patient Seen in: [facility] 3ne-a      History   Patient presents with:  Back Pain    Stated Complaint: back pain, known L2 fx    Subjective:   HPI    Patient is a 26-year-old male comes emergency room for evaluation of back pain.   Patient complains °C)   Temp src Tympanic   SpO2 98 %   O2 Device None (Room air)       Current:BP (!) 187/105 (BP Location: Right arm)   Pulse 92   Temp 97.6 °F (36.4 °C) (Tympanic)   Resp 22   Ht 188 cm (6' 2\")   Wt (!) 181.4 kg   SpO2 95%   BMI 51.36 kg/m²         Physi created for panel order CBC With Differential With Platelet.   Procedure                               Abnormality         Status                     ---------                               -----------         ------                     CBC W/ DIFFERENTIAL[ lights. Right leg pain. FINDINGS:  PARASPINAL AREA:  Scattered normal caliber periaortic lymph nodes. BONES:  There is a superior endplate compression fracture with approximately 25% loss of vertebral body height anteriorly and mid vertebral body level. with hospitalist, Dr. Oren Parish. CT scan shows L2 compression fracture without evidence for retropulsion. MRI was ordered for the floor. He will be admitted for observation for pain control and spine surgery evaluation.   Workup and results were discussed w

## 2022-01-04 NOTE — CONSULTS
[facility]  Neurosurgery Consult    Noel James Patient Status:  Emergency    10/14/1985 MRN [id number]   Location [address] Attending Loly Beavers MD   Hosp Day # 0 PCP Nilo Collins, [address] [place] [address] smoking. He has been smoking about 0.10 packs per day. He has never used smokeless tobacco. He reports current alcohol use. He reports current drug use. Drug: Cannabis.     ALLERGIES:    Clindamycin             RASH    MEDICATIONS:  (Not in a hospital admis  There is stable    mild loss of height of L5 vertebral body along the inferior endplate unchanged from 3/19/2015.  Preservation of height of remaining lumbar vertebral bodies. .       LUMBAR DISC LEVELS:   L1-L2:  Minimal diffuse disc bulge.  Mild degenera REJI Wright acting as scribe. I agree with the note as written above with any additions or corrections by me in bold font.     We will await results of MRI as his pain and the radicular component into the thoracic region as well as down the leg are not

## 2022-01-05 ENCOUNTER — APPOINTMENT (OUTPATIENT)
Dept: INTERVENTIONAL RADIOLOGY/VASCULAR | Facility: HOSPITAL | Age: 37
End: 2022-01-05
Attending: NURSE PRACTITIONER
Payer: MEDICAID

## 2022-01-05 LAB
INR BLD: 0.98 (ref 0.8–1.2)
PROTHROMBIN TIME: 13 SECONDS (ref 11.6–14.8)

## 2022-01-05 PROCEDURE — 0QU03JZ SUPPLEMENT LUMBAR VERTEBRA WITH SYNTHETIC SUBSTITUTE, PERCUTANEOUS APPROACH: ICD-10-PCS | Performed by: RADIOLOGY

## 2022-01-05 PROCEDURE — 0QS03ZZ REPOSITION LUMBAR VERTEBRA, PERCUTANEOUS APPROACH: ICD-10-PCS | Performed by: RADIOLOGY

## 2022-01-05 PROCEDURE — 99225 SUBSEQUENT OBSERVATION CARE: CPT | Performed by: HOSPITALIST

## 2022-01-05 RX ORDER — MIDAZOLAM HYDROCHLORIDE 1 MG/ML
INJECTION INTRAMUSCULAR; INTRAVENOUS
Status: COMPLETED
Start: 2022-01-05 | End: 2022-01-05

## 2022-01-05 RX ORDER — CYCLOBENZAPRINE HCL 10 MG
10 TABLET ORAL 3 TIMES DAILY
Status: DISCONTINUED | OUTPATIENT
Start: 2022-01-05 | End: 2022-01-06

## 2022-01-05 RX ORDER — HYDROMORPHONE HYDROCHLORIDE 1 MG/ML
0.4 INJECTION, SOLUTION INTRAMUSCULAR; INTRAVENOUS; SUBCUTANEOUS EVERY 2 HOUR PRN
Status: DISCONTINUED | OUTPATIENT
Start: 2022-01-05 | End: 2022-01-07

## 2022-01-05 RX ORDER — CEFAZOLIN SODIUM/WATER 2 G/20 ML
SYRINGE (ML) INTRAVENOUS
Status: COMPLETED
Start: 2022-01-05 | End: 2022-01-05

## 2022-01-05 RX ORDER — HYDROMORPHONE HYDROCHLORIDE 1 MG/ML
1.2 INJECTION, SOLUTION INTRAMUSCULAR; INTRAVENOUS; SUBCUTANEOUS EVERY 2 HOUR PRN
Status: DISCONTINUED | OUTPATIENT
Start: 2022-01-05 | End: 2022-01-06

## 2022-01-05 RX ORDER — LIDOCAINE HYDROCHLORIDE 10 MG/ML
INJECTION, SOLUTION INFILTRATION; PERINEURAL
Status: COMPLETED
Start: 2022-01-05 | End: 2022-01-05

## 2022-01-05 RX ORDER — HYDROMORPHONE HYDROCHLORIDE 1 MG/ML
1 INJECTION, SOLUTION INTRAMUSCULAR; INTRAVENOUS; SUBCUTANEOUS ONCE
Status: COMPLETED | OUTPATIENT
Start: 2022-01-05 | End: 2022-01-05

## 2022-01-05 RX ORDER — HYDROMORPHONE HYDROCHLORIDE 1 MG/ML
0.8 INJECTION, SOLUTION INTRAMUSCULAR; INTRAVENOUS; SUBCUTANEOUS EVERY 2 HOUR PRN
Status: DISCONTINUED | OUTPATIENT
Start: 2022-01-05 | End: 2022-01-06

## 2022-01-05 NOTE — OCCUPATIONAL THERAPY NOTE
Attempted to see pt for skilled OT services this date. Pt is currently awaiting kyphoplasty and LSO brace. Will re-attempt as schedule allows.  Avis [place], 01/05/22, 11:10 AM

## 2022-01-05 NOTE — PLAN OF CARE
Patient is AOx4. BP elevated this AM, trending down. PRN meds given, see MAR. The rest of vital signs stable, afebrile. Verbalizes severe lower back spastic pain that radiates to LE.  Pain worsened with movement and managed around the clock with some relief increased pain with activity and pre-medicate as appropriate  Outcome: Progressing

## 2022-01-05 NOTE — PLAN OF CARE
A&Ox4, BP controlled with medications (see MAR), afebrile. Reports of severe pain, treated with medications (see MAR) and pt reports that laying flat on back and resting helps with the pain as well. See flowsheets for full assessment.  Plan for kyphoplasty

## 2022-01-05 NOTE — PLAN OF CARE
NURSING ADMISSION NOTE      Patient admitted to unit via Cart from ER. Oriented to room. Safety precautions initiated. Bed in low position. Call light in reach.

## 2022-01-05 NOTE — PHYSICAL THERAPY NOTE
Order received for Physical Therapy evaluation. Spoke with Rn and patient scheduled for kyphoplasty this PM and still awaiting LSO. Will check status and proceed with PT services as indicated.

## 2022-01-05 NOTE — PROCEDURES
[facility]  Procedure Note    [place] L[place] Shotts Patient Status:  Observation    10/14/1985 MRN [id number]   Location [address] [facility] Attending Marsha Collins MD   Hosp Day # 0 PCP Donald Anne DO     Procedure: L2 kyphoplasty

## 2022-01-05 NOTE — PROGRESS NOTES
[facility]  Neurosurgery Progress Note    Hadley Favre Shotts Patient Status:  Observation    10/14/1985 MRN [id number]   [facility] 3NE-A Attending Kraig Ventura MD   Hosp Day # 0 PCP Merly Domingo DO     Chief Complaint:  Patient pres within the L1 vertebral body.  The thoracic cord is unremarkable in signal within the limitations of the motion degradation.  There are mild scattered endplate degenerative changes.  The disc heights   are preserved.  No significant disc protrusion is seen preserved.    CORD/CAUDA EQUINA:  Normal caliber, contour, and signal intensity.                          Impression   CONCLUSION: Valeriano Dela Cruz is an acute mild-to-moderate compression fracture superior endplate of L2 without retropulsion.       Mild degenerative

## 2022-01-05 NOTE — PLAN OF CARE
Alert x4. 2LNC d/t pt complaints of difficulty catching breath with severe pain. Pt states relief with supplemental O2. Sats WNL. BP elevated- PRN meds given. Voids, urinal. Up SBA.  C/o severe pain, muscle spasms to lower back- PRN pain meds given with zenobia

## 2022-01-05 NOTE — PROGRESS NOTES
[facility]     Hospitalist Progress Note     Blanquita James Patient Status:  Observation    10/14/1985 MRN [id number]   [facility] 3NE-A Attending Abhi Rodas MD   Hosp Day # 0 PCP Patria Harrison DO     Chief Complaint: back pain Heparin Sodium (Porcine)  7,500 Units Subcutaneous [facility]   • sennosides  17.2 mg Oral Daily   • oxyCODONE  10 mg Oral 2 times per day   • amLODIPine  10 mg Oral Daily       Assessment & Plan:      #Lower back pain with evidence of compression fracture of

## 2022-01-05 NOTE — PROGRESS NOTES
Pt called this RN to room. States pain medications giving only little to no relief to back pain/ muscle spasms. Requests to speak to hospitalist to add or change pain med regimen. MD paged.  Awaiting callback    Spoke with MD- see new PRN orders

## 2022-01-06 PROCEDURE — 99225 SUBSEQUENT OBSERVATION CARE: CPT | Performed by: HOSPITALIST

## 2022-01-06 RX ORDER — CALCIUM CARBONATE 200(500)MG
500 TABLET,CHEWABLE ORAL 3 TIMES DAILY PRN
Status: DISCONTINUED | OUTPATIENT
Start: 2022-01-06 | End: 2022-01-07

## 2022-01-06 RX ORDER — OXYCODONE AND ACETAMINOPHEN 10; 325 MG/1; MG/1
1-2 TABLET ORAL EVERY 4 HOURS PRN
Status: DISCONTINUED | OUTPATIENT
Start: 2022-01-06 | End: 2022-01-07

## 2022-01-06 RX ORDER — AMLODIPINE BESYLATE 10 MG/1
10 TABLET ORAL DAILY
Qty: 30 TABLET | Refills: 0 | Status: SHIPPED | OUTPATIENT
Start: 2022-01-07

## 2022-01-06 RX ORDER — OXYCODONE AND ACETAMINOPHEN 10; 325 MG/1; MG/1
1-2 TABLET ORAL EVERY 4 HOURS PRN
Qty: 40 TABLET | Refills: 0 | Status: SHIPPED | OUTPATIENT
Start: 2022-01-06

## 2022-01-06 RX ORDER — TIZANIDINE 4 MG/1
4 TABLET ORAL EVERY 6 HOURS PRN
Qty: 30 TABLET | Refills: 0 | Status: SHIPPED | OUTPATIENT
Start: 2022-01-06

## 2022-01-06 RX ORDER — TIZANIDINE 4 MG/1
4 TABLET ORAL EVERY 6 HOURS PRN
Status: DISCONTINUED | OUTPATIENT
Start: 2022-01-06 | End: 2022-01-07

## 2022-01-06 NOTE — PLAN OF CARE
Pt is A&Ox4. VSS, afebrile. Maintaining O2 sats WDL on RA. No tele, Q8 vitals. Heparin. Last BM 1/4. General diet. Voids. Up at himanshu. C/o lower back pain, see MAR. PIV, SL. Wound lower back- c/d/I. WCTM and update pt on POC. Safety precautions in place.

## 2022-01-06 NOTE — PROGRESS NOTES
[facility]  Neurosurgery Progress Note    [place] Shotts Patient Status:  Observation    10/14/1985 MRN [id number]   [facility] 3NE-A Attending Ge Rivera MD   Hosp Day # 0 PCP Taryn Gallardo DO     Chief Complaint:  Patient pres

## 2022-01-06 NOTE — PROGRESS NOTES
Neuro MD Dr. Ivy Malin consulted for pain management per order. Attempted to page MD via perfect serve x2, unsuccessful both times.  [address] clinic directly, the  answered saying the clinic is closed and suggested to call ba

## 2022-01-06 NOTE — OCCUPATIONAL THERAPY NOTE
Follow up. Waiting for LSO brace. Per PT who spoke with RN, the brace is to be delivered today. Will continue to follow.

## 2022-01-06 NOTE — OCCUPATIONAL THERAPY NOTE
OCCUPATIONAL THERAPY EVALUATION - INPATIENT    Room Number: 7031/4704-J  Evaluation Date: 1/6/2022     Type of Evaluation: Initial  Presenting Problem: back pain, L2 compression fracture, 1/5 kyphoplasty    Physician Order: IP Consult to Occupational Thera Toileting: independent clothing management. Pt was squatting independently. Independent hygiene care.     Functional Mobility:  Supine to Sit : Minimum assistance  Sit to Stand: Modified independent  Ambulation used RW    Education provided: Role of OT, Occupational Therapy needs at this time. Patient discharged from Occupational Therapy services. Please re-order if a new functional limitation presents during this admission. Writer PPE: Surgical mask, goggles, and gloves worn.      Patient/family PPE:

## 2022-01-06 NOTE — PLAN OF CARE
Patient is AOx4. BP elevated- max SBP in the 150s, but improved with PRN meds. The rest of vital signs stable. Reports uncontrolled lower back pain- percocet and lidocaine patch added onto MAR.  Patient is worried about going home if pain cannot be managed

## 2022-01-06 NOTE — PROGRESS NOTES
[facility]     Hospitalist Progress Note     Malathi James Patient Status:  Observation    10/14/1985 MRN [id number]   [facility] 3NE-A Attending Gino Gutiérrez MD   Hosp Day # 0 PCP Leia Garcia DO     Chief Complaint: back pain Heparin Sodium (Porcine)  7,500 Units Subcutaneous [facility]   • sennosides  17.2 mg Oral Daily   • oxyCODONE  10 mg Oral 2 times per day   • amLODIPine  10 mg Oral Daily       Assessment & Plan:      #Lower back pain with evidence of compression fracture of

## 2022-01-06 NOTE — PHYSICAL THERAPY NOTE
PT orders received, chart reviewed. Per RN, pt missing one piece of LSO, [employer] to deliver today. RN to contact therapy once LSO piece arrives. Will attempt to see pt as schedule permits once LSO piece arrives. RN in agreement.

## 2022-01-06 NOTE — PHYSICAL THERAPY NOTE
PHYSICAL THERAPY EVALUATION - INPATIENT     Room Number: 7044/3129-K  Evaluation Date: 1/6/2022  Type of Evaluation: Initial  Physician Order: PT Eval and Treat    Presenting Problem: L2 compression fx, s/p kyphoplasty 1/5/22  Co-Morbidities : GERD, teacher. SUBJECTIVE  \"I don't think the procedure took. \"      OBJECTIVE  Precautions: Lumbar brace;Spine       WEIGHT BEARING RESTRICTION  Weight Bearing Restriction: None                PAIN ASSESSMENT  Rating: 10  Location: low back   Many Stairs: 4  Device: 2 Rails  Assist: Supervision  Pattern: Ascend and Descend  Ascend and Descend : Step to    Skilled Therapy Provided     Bed Mobility:  Rolling: supervision  Supine to sit: min assist   Sit to supine: not tested     Transfer Mobility

## 2022-01-07 VITALS
SYSTOLIC BLOOD PRESSURE: 137 MMHG | HEART RATE: 87 BPM | DIASTOLIC BLOOD PRESSURE: 89 MMHG | RESPIRATION RATE: 18 BRPM | WEIGHT: 315 LBS | BODY MASS INDEX: 40.43 KG/M2 | HEIGHT: 74 IN | TEMPERATURE: 98 F | OXYGEN SATURATION: 95 %

## 2022-01-07 PROCEDURE — 99217 OBSERVATION CARE DISCHARGE: CPT | Performed by: HOSPITALIST

## 2022-01-07 RX ORDER — OXYCODONE AND ACETAMINOPHEN 10; 325 MG/1; MG/1
1 TABLET ORAL EVERY 4 HOURS PRN
Status: DISCONTINUED | OUTPATIENT
Start: 2022-01-07 | End: 2022-01-07

## 2022-01-07 RX ORDER — OXYCODONE AND ACETAMINOPHEN 10; 325 MG/1; MG/1
2 TABLET ORAL EVERY 4 HOURS PRN
Status: DISCONTINUED | OUTPATIENT
Start: 2022-01-07 | End: 2022-01-07

## 2022-01-07 NOTE — PROGRESS NOTES
NURSING DISCHARGE NOTE    Discharged Home via Wheelchair. Accompanied by Support staff  Belongings Taken by patient/family. Patient discharged home. Meds given by meds to beds yesterday. All questions answered. Return to work note given.

## 2022-01-07 NOTE — PROGRESS NOTES
[facility]     Hospitalist Progress Note     Marjoceline Manuelpierce Patient Status:  Observation    10/14/1985 MRN [id number]   [facility] 3NE-A Attending Marty Scruggs MD   Hosp Day # 0 PCP Kamran Ruvalcaba DO     Chief Complaint: back pain Pt upgraded to telemetry for closer monitoring. RN report given to Marisol ALMAZAN. Respiratory and writer accompanied patient and transporters in transfer. Pt's mother updated per patient request. Pt transferred in stable condition.    Heparin Sodium (Porcine)  7,500 Units Subcutaneous [facility]   • sennosides  17.2 mg Oral Daily   • amLODIPine  10 mg Oral Daily       Assessment & Plan:      #Lower back pain with evidence of compression fracture of L2  - s/p Kypoplasty- appreciate Dr. J Carlos Alvarado

## 2022-01-07 NOTE — PROGRESS NOTES
1/7/2022    Patient Name: Bee Krause      To Whom It May Concern: This letter has been written at the patient's request. The above patient was seen at [facility] for treatment of a medical condition from 1/4/22 - 1/7/22.   Please excuse my patie

## 2022-01-07 NOTE — PLAN OF CARE
Problem: Patient/Family Goals  Goal: Patient/Family Long Term Goal  Description: Patient's Long Term Goal: discharge w/ appropriate resources    Interventions:  - comply with POC  - See additional Care Plan goals for specific interventions  Outcome:  Adeq

## 2022-01-07 NOTE — PROGRESS NOTES
D: patient received into care at 1900. At this time patient c/o 10/10 pain. Writer paged pharmacy re: no percocet on unit and pt requiring meds asap. Pharmacy responsive and tubed medication up to writer. Patient pleasant and cooperative at this time.  Pain

## 2022-01-08 NOTE — DISCHARGE SUMMARY
[facility]  DISCHARGE SUMMARY     Sweetie James Patient Status:  Observation    10/14/1985 MRN [id number]   [facility] 3NE-A Attending No att. providers found   Hosp Day # 0 PCP Kelli Walker DO     Date of Admission: 2022  D improving and able to ambulate with adequate pain control. Patient was cleared by consultants and discharged in stable condition.     Procedures during hospitalization:   • Kyphoplasty    Incidental or significant findings and recommendations (brief descri Tabs  · tiZANidine 4 MG Tabs     Please  your prescriptions at the location directed by your doctor or nurse    Bring a paper prescription for each of these medications  · oxyCODONE-acetaminophen  MG Tabs         Follow-up appointment:   Gomez Hawkins

## (undated) NOTE — ED AVS SNAPSHOT
Joanne Delacruz   MRN: [id number]    Department:  [facility] Emergency Department   Date of Visit:  4/25/2019           Disclosure     Insurance plans vary and the physician(s) referred by the ER may not be covered by your plan.  Please contact y CARE PHYSICIAN AT ONCE OR RETURN IMMEDIATELY TO THE EMERGENCY DEPARTMENT. If you have been prescribed any medication(s), please fill your prescription right away and begin taking the medication(s) as directed.   If you believe that any of the medications

## (undated) NOTE — LETTER
[facility] [address], [address]  Consent for Procedure/Sedation    Date: 1/5/22    Time:       1. I authorize the performance upon Christiane Farley the following:  Kyphoplasty      2.  I authorize Dr. Monserrat Marmolejo (and whomever is design Record #: [id number]

## (undated) NOTE — LETTER
Date & Time: 4/25/2019, 4:03 PM  Patient: Raquel Manuelts  Encounter Provider(s):    Selena Woody MD       To Whom It May Concern:    Alayna Capellan was seen and treated in our department on 4/25/2019. He should be excused from work for 2-3 days.     If y

## (undated) NOTE — LETTER
Date & Time: 4/29/2019, 9:28 PM  Patient: Virginia Manuelts  Encounter Provider(s):    Areli Burr MD       To Whom It May Concern:     Daryn Sparks was seen and treated in our department on 4/29/2019. He should not return to work until 5/1/2019.

## (undated) NOTE — LETTER
Ivania Anderson [address]  [address] [facility] [address], [address] [facility]  Authorization for Surgical Operation and Procedure     Date:___________                                                                                                         Time:__________ reactions, hemolytic reactions, transmission of diseases such as Hepatitis, AIDS and Cytomegalovirus (CMV) and fluid overload. In the event that I wish to have an autologous transfusion of my own blood, or a directed donor transfusion.   I will discuss thi ends for purposes of reinstating the DNAR order.   10. Patients having a sterilization procedure: I understand that if the procedure is successful the results will be permanent and it will therefore be impossible for me to inseminate, conceive, or bear chil procedures as necessary. Some examples are: Starting or using an “IV” to give me medicine, fluids or blood during my procedure, and having a breathing tube placed to help me breathe when I’m asleep (intubation).  In the event that my heart stops working pro include headache, bleeding, infection, seizure, irregular heart rhythms, and nerve injury.     I can change my mind about having anesthesia services at any time before I get the medicine.    __________________________________________________________________

## (undated) NOTE — ED AVS SNAPSHOT
Thelma Lema   MRN: [id number]    Department:  [facility] Emergency Department   Date of Visit:  4/29/2019           Disclosure     Insurance plans vary and the physician(s) referred by the ER may not be covered by your plan.  Please contact y CARE PHYSICIAN AT ONCE OR RETURN IMMEDIATELY TO THE EMERGENCY DEPARTMENT. If you have been prescribed any medication(s), please fill your prescription right away and begin taking the medication(s) as directed.   If you believe that any of the medications

## (undated) NOTE — ED AVS SNAPSHOT
[facility] in [facility] [address] [facility]    Phone:  [phone]    Fax:  [phone]   MRN: [id number]    Department:  [facility] in [place]   Date of Visit: IF THERE IS ANY CHANGE OR WORSENING OF YOUR CONDITION, CALL YOUR PRIMARY CARE PHYSICIAN AT ONCE OR RETURN IMMEDIATELY TO THE EMERGENCY DEPARTMENT.     If you have been prescribed any medication(s), please fill your prescription right away and begin taking t

## (undated) NOTE — ED AVS SNAPSHOT
[facility] in [address] [facility]    Phone:  [phone]    Fax:  [phone]   MRN: [id number]    Department:  [facility] in [place]   Date of Visit: You can get these medications from any pharmacy     Bring a paper prescription for each of these medications    - HYDROcodone-acetaminophen 5-325 MG Tabs              Discharge Instructions       Take ibuprofen 600 mg with food 3 times a day.   No alcohol w return to your personal doctor) about any new or lasting problems. The primary care or specialist physician will see patients referred from the [facility] Emergency Department. Follow-up care is at the discretion of that Physician.     IF THERE IS ANY - If you have concerns related to behavioral health issues or thoughts of harming yourself, contact [address] [place] [address] at [phone].     - If you don’t have insurance, Ilir Davis has partnered with Patient Dunn C PATIENT STATED HISTORY:  Patient fell down the stairs last night and complain of left side upper abdomen/rib area pain radiating from anterior towards all the to spine since fall. Patient also complain of shortness of breath.       CONTRAST USED:  100 cc o complains of pain to his left, lower ribs and states it radiates all the way from the anterior aspect around to his spine. Patient also complains of shortness of breath.   He states he   has a history of fractured ribs to his left and right side a few year